# Patient Record
Sex: FEMALE
[De-identification: names, ages, dates, MRNs, and addresses within clinical notes are randomized per-mention and may not be internally consistent; named-entity substitution may affect disease eponyms.]

---

## 2019-06-13 VITALS — SYSTOLIC BLOOD PRESSURE: 133 MMHG | DIASTOLIC BLOOD PRESSURE: 67 MMHG

## 2019-06-13 LAB
BASOPHILS NFR BLD AUTO: 0.6 % (ref 0–5)
BUN SERPL-MCNC: 13 MG/DL (ref 7–18)
CHLORIDE SERPL-SCNC: 100 MMOL/L (ref 101–111)
CO2 SERPL-SCNC: 32 MMOL/L (ref 21–32)
CREAT SERPL-MCNC: 0.7 MG/DL (ref 0.5–1.5)
EOSINOPHIL NFR BLD AUTO: 1.5 % (ref 0–8)
ERYTHROCYTE [DISTWIDTH] IN BLOOD BY AUTOMATED COUNT: 12.7 % (ref 11–15.5)
GFR SERPL CREATININE-BSD FRML MDRD: 91 ML/MIN (ref 60–?)
GLUCOSE SERPL-MCNC: 347 MG/DL (ref 70–105)
HCT VFR BLD AUTO: 37.9 % (ref 36–48)
LYMPHOCYTES NFR SPEC AUTO: 39.8 % (ref 21–51)
MCH RBC QN AUTO: 29.7 PG (ref 27–33)
MCHC RBC AUTO-ENTMCNC: 33.2 G/DL (ref 32–36)
MCV RBC AUTO: 89.4 FL (ref 79–99)
MONOCYTES NFR BLD AUTO: 4.6 % (ref 3–13)
NEUTROPHILS NFR BLD AUTO: 53.5 % (ref 40–77)
NRBC BLD MANUAL-RTO: 0 % (ref 0–0.19)
PLATELET # BLD AUTO: 305 K/UL (ref 130–400)
POTASSIUM SERPL-SCNC: 4.1 MMOL/L (ref 3.5–5.1)
RBC # BLD AUTO: 4.24 MIL/UL (ref 4–5.5)
SODIUM SERPL-SCNC: 139 MMOL/L (ref 136–145)
WBC # BLD AUTO: 8 K/UL (ref 4.8–10.8)

## 2019-06-14 ENCOUNTER — HOSPITAL ENCOUNTER (OUTPATIENT)
Dept: HOSPITAL 90 - DAH | Age: 59
Discharge: HOME | End: 2019-06-14
Attending: ORTHOPAEDIC SURGERY
Payer: COMMERCIAL

## 2019-06-14 VITALS — DIASTOLIC BLOOD PRESSURE: 72 MMHG | SYSTOLIC BLOOD PRESSURE: 160 MMHG

## 2019-06-14 VITALS — SYSTOLIC BLOOD PRESSURE: 154 MMHG | DIASTOLIC BLOOD PRESSURE: 71 MMHG

## 2019-06-14 VITALS — BODY MASS INDEX: 27.68 KG/M2 | WEIGHT: 172.2 LBS | HEIGHT: 66 IN

## 2019-06-14 VITALS — DIASTOLIC BLOOD PRESSURE: 71 MMHG | SYSTOLIC BLOOD PRESSURE: 147 MMHG

## 2019-06-14 VITALS — DIASTOLIC BLOOD PRESSURE: 67 MMHG | SYSTOLIC BLOOD PRESSURE: 159 MMHG

## 2019-06-14 VITALS — DIASTOLIC BLOOD PRESSURE: 70 MMHG | SYSTOLIC BLOOD PRESSURE: 158 MMHG

## 2019-06-14 VITALS — DIASTOLIC BLOOD PRESSURE: 64 MMHG | SYSTOLIC BLOOD PRESSURE: 157 MMHG

## 2019-06-14 VITALS — SYSTOLIC BLOOD PRESSURE: 148 MMHG | DIASTOLIC BLOOD PRESSURE: 61 MMHG

## 2019-06-14 VITALS — SYSTOLIC BLOOD PRESSURE: 152 MMHG | DIASTOLIC BLOOD PRESSURE: 69 MMHG

## 2019-06-14 VITALS — SYSTOLIC BLOOD PRESSURE: 158 MMHG | DIASTOLIC BLOOD PRESSURE: 71 MMHG

## 2019-06-14 VITALS — DIASTOLIC BLOOD PRESSURE: 74 MMHG | SYSTOLIC BLOOD PRESSURE: 139 MMHG

## 2019-06-14 VITALS — SYSTOLIC BLOOD PRESSURE: 144 MMHG | DIASTOLIC BLOOD PRESSURE: 66 MMHG

## 2019-06-14 VITALS — DIASTOLIC BLOOD PRESSURE: 66 MMHG | SYSTOLIC BLOOD PRESSURE: 145 MMHG

## 2019-06-14 VITALS — SYSTOLIC BLOOD PRESSURE: 145 MMHG | DIASTOLIC BLOOD PRESSURE: 70 MMHG

## 2019-06-14 VITALS — SYSTOLIC BLOOD PRESSURE: 144 MMHG | DIASTOLIC BLOOD PRESSURE: 65 MMHG

## 2019-06-14 DIAGNOSIS — Z90.710: ICD-10-CM

## 2019-06-14 DIAGNOSIS — G89.29: ICD-10-CM

## 2019-06-14 DIAGNOSIS — Z79.84: ICD-10-CM

## 2019-06-14 DIAGNOSIS — E11.9: ICD-10-CM

## 2019-06-14 DIAGNOSIS — M75.41: ICD-10-CM

## 2019-06-14 DIAGNOSIS — E03.9: ICD-10-CM

## 2019-06-14 DIAGNOSIS — Z98.890: ICD-10-CM

## 2019-06-14 DIAGNOSIS — M75.01: Primary | ICD-10-CM

## 2019-06-14 DIAGNOSIS — M19.90: ICD-10-CM

## 2019-06-14 DIAGNOSIS — Z79.4: ICD-10-CM

## 2019-06-14 DIAGNOSIS — M19.011: ICD-10-CM

## 2019-06-14 DIAGNOSIS — Z79.899: ICD-10-CM

## 2019-06-14 DIAGNOSIS — E78.5: ICD-10-CM

## 2019-06-14 PROCEDURE — 80048 BASIC METABOLIC PNL TOTAL CA: CPT

## 2019-06-14 PROCEDURE — 29826 SHO ARTHRS SRG DECOMPRESSION: CPT

## 2019-06-14 PROCEDURE — 82948 REAGENT STRIP/BLOOD GLUCOSE: CPT

## 2019-06-14 PROCEDURE — 29806 SHO ARTHRS SRG CAPSULORRAPHY: CPT

## 2019-06-14 PROCEDURE — 36415 COLL VENOUS BLD VENIPUNCTURE: CPT

## 2019-06-14 PROCEDURE — 29824 SHO ARTHRS SRG DSTL CLAVICLC: CPT

## 2019-06-14 PROCEDURE — 85025 COMPLETE CBC W/AUTO DIFF WBC: CPT

## 2019-06-14 PROCEDURE — 64415 NJX AA&/STRD BRCH PLXS IMG: CPT

## 2019-06-14 RX ADMIN — SODIUM CHLORIDE ONE GM: 9 INJECTION, SOLUTION INTRAVENOUS at 12:45

## 2019-06-14 RX ADMIN — SODIUM CHLORIDE ONE GM: 9 INJECTION, SOLUTION INTRAVENOUS at 11:34

## 2019-06-14 NOTE — NUR
RECEIVE

PT RECEIVED FROM PACU AWAKE ALERT ORIENTED X3. PT STABLE. PT DENIES NAUSEA. RIGHT ARM SLING 
IN PLACE, RIGHT SHOULDER INCISION SITE WITH SOME SWELLING NOTED, ICE PACK APPLIED TO SITE. 
RIGHT SHOULDER DRESSING X3 DRY AND INTACT, NO OOZING NOTED, SENSATION INTACT, PULSES 
PRESENT, HAND GRASP WEAK. WILL CONTINUE TO MONITOR PT. CALL [facility] WITHIN REACH, WILL CALL FOR 
 TO COME IN TO ROOM.

## 2019-06-14 NOTE — NUR
POTENTIAL FOR INFECTION:

NO SHAVING NEEDED TO RIGHT SHOULDER AS STATED PER BRIE VERA MA. WIPED RT SHOULDER WITH 
ANDREINA: 2% CHLORHEXIDINE GLUCONATE CLOTH PATIENTS PRE-OP SKIN PREP PER BRIE VERA MA.

## 2019-06-14 NOTE — NUR
MEDICATIONS:

PATIENTS LEFT CERTAIN MEDICATIONS AT HOME WHICH WERE VITAMIN B12 COMPLEX, INSULINS (LEVEMIR 
& NOVOLOG), LEVOTHYROXINE AND TYLENOL X-STRENGTH.

## 2020-04-01 ENCOUNTER — HOSPITAL ENCOUNTER (OUTPATIENT)
Dept: HOSPITAL 90 - RAH | Age: 60
Discharge: HOME | End: 2020-04-01
Attending: FAMILY MEDICINE
Payer: COMMERCIAL

## 2020-04-01 DIAGNOSIS — E04.1: Primary | ICD-10-CM

## 2020-04-01 PROCEDURE — 76536 US EXAM OF HEAD AND NECK: CPT

## 2020-12-11 ENCOUNTER — HOSPITAL ENCOUNTER (OUTPATIENT)
Dept: HOSPITAL 90 - RAH | Age: 60
Discharge: HOME | End: 2020-12-11
Attending: INTERNAL MEDICINE
Payer: COMMERCIAL

## 2020-12-11 DIAGNOSIS — E04.2: Primary | ICD-10-CM

## 2020-12-11 PROCEDURE — 76536 US EXAM OF HEAD AND NECK: CPT
